# Patient Record
Sex: MALE | Race: WHITE | NOT HISPANIC OR LATINO | Employment: OTHER | URBAN - METROPOLITAN AREA
[De-identification: names, ages, dates, MRNs, and addresses within clinical notes are randomized per-mention and may not be internally consistent; named-entity substitution may affect disease eponyms.]

---

## 2022-04-28 ENCOUNTER — OFFICE VISIT (OUTPATIENT)
Dept: UROLOGY | Facility: CLINIC | Age: 77
End: 2022-04-28
Payer: MEDICARE

## 2022-04-28 VITALS
HEART RATE: 66 BPM | SYSTOLIC BLOOD PRESSURE: 100 MMHG | DIASTOLIC BLOOD PRESSURE: 58 MMHG | BODY MASS INDEX: 29.92 KG/M2 | WEIGHT: 202 LBS | HEIGHT: 69 IN

## 2022-04-28 DIAGNOSIS — N40.0 BENIGN PROSTATIC HYPERPLASIA, UNSPECIFIED WHETHER LOWER URINARY TRACT SYMPTOMS PRESENT: Primary | ICD-10-CM

## 2022-04-28 LAB — POC RESIDUAL URINE VOLUME: 0 ML (ref 0–100)

## 2022-04-28 PROCEDURE — 51798 POCT BLADDER SCAN: ICD-10-PCS | Mod: S$GLB,,, | Performed by: NURSE PRACTITIONER

## 2022-04-28 PROCEDURE — 1160F PR REVIEW ALL MEDS BY PRESCRIBER/CLIN PHARMACIST DOCUMENTED: ICD-10-PCS | Mod: CPTII,S$GLB,, | Performed by: NURSE PRACTITIONER

## 2022-04-28 PROCEDURE — 3078F DIAST BP <80 MM HG: CPT | Mod: CPTII,S$GLB,, | Performed by: NURSE PRACTITIONER

## 2022-04-28 PROCEDURE — 1159F PR MEDICATION LIST DOCUMENTED IN MEDICAL RECORD: ICD-10-PCS | Mod: CPTII,S$GLB,, | Performed by: NURSE PRACTITIONER

## 2022-04-28 PROCEDURE — 99204 OFFICE O/P NEW MOD 45 MIN: CPT | Mod: S$GLB,,, | Performed by: NURSE PRACTITIONER

## 2022-04-28 PROCEDURE — 1159F MED LIST DOCD IN RCRD: CPT | Mod: CPTII,S$GLB,, | Performed by: NURSE PRACTITIONER

## 2022-04-28 PROCEDURE — 3074F PR MOST RECENT SYSTOLIC BLOOD PRESSURE < 130 MM HG: ICD-10-PCS | Mod: CPTII,S$GLB,, | Performed by: NURSE PRACTITIONER

## 2022-04-28 PROCEDURE — 3074F SYST BP LT 130 MM HG: CPT | Mod: CPTII,S$GLB,, | Performed by: NURSE PRACTITIONER

## 2022-04-28 PROCEDURE — 1160F RVW MEDS BY RX/DR IN RCRD: CPT | Mod: CPTII,S$GLB,, | Performed by: NURSE PRACTITIONER

## 2022-04-28 PROCEDURE — 99204 PR OFFICE/OUTPT VISIT, NEW, LEVL IV, 45-59 MIN: ICD-10-PCS | Mod: S$GLB,,, | Performed by: NURSE PRACTITIONER

## 2022-04-28 PROCEDURE — 3078F PR MOST RECENT DIASTOLIC BLOOD PRESSURE < 80 MM HG: ICD-10-PCS | Mod: CPTII,S$GLB,, | Performed by: NURSE PRACTITIONER

## 2022-04-28 PROCEDURE — 51798 US URINE CAPACITY MEASURE: CPT | Mod: S$GLB,,, | Performed by: NURSE PRACTITIONER

## 2022-04-28 RX ORDER — PNV NO.95/FERROUS FUM/FOLIC AC 28MG-0.8MG
1000 TABLET ORAL DAILY
COMMUNITY

## 2022-04-28 RX ORDER — MEMANTINE HYDROCHLORIDE 10 MG/1
TABLET ORAL
COMMUNITY
Start: 2022-02-07

## 2022-04-28 RX ORDER — VITAMIN B COMPLEX
1 CAPSULE ORAL DAILY
COMMUNITY

## 2022-04-28 RX ORDER — ROSUVASTATIN CALCIUM 10 MG/1
TABLET, COATED ORAL
COMMUNITY
Start: 2022-02-07

## 2022-04-28 RX ORDER — DONEPEZIL HYDROCHLORIDE 10 MG/1
TABLET, FILM COATED ORAL
COMMUNITY
Start: 2022-02-07

## 2022-04-28 RX ORDER — FINASTERIDE 5 MG/1
5 TABLET, FILM COATED ORAL DAILY
COMMUNITY

## 2022-04-28 RX ORDER — CYANOCOBALAMIN (VITAMIN B-12) 500 MCG
TABLET ORAL
COMMUNITY

## 2022-04-28 RX ORDER — PRAMIPEXOLE DIHYDROCHLORIDE 0.5 MG/1
TABLET ORAL
COMMUNITY
Start: 2022-02-07

## 2022-04-28 RX ORDER — CHOLECALCIFEROL (VITAMIN D3) 25 MCG
1000 TABLET ORAL DAILY
COMMUNITY

## 2022-04-28 RX ORDER — NAPROXEN SODIUM 220 MG/1
81 TABLET, FILM COATED ORAL DAILY
COMMUNITY

## 2022-04-28 RX ORDER — TAMSULOSIN HYDROCHLORIDE 0.4 MG/1
CAPSULE ORAL
COMMUNITY
Start: 2022-02-07

## 2022-04-28 RX ORDER — DOXYCYCLINE 100 MG/1
CAPSULE ORAL
COMMUNITY
Start: 2022-03-22

## 2022-04-28 RX ORDER — TRAZODONE HYDROCHLORIDE 150 MG/1
TABLET ORAL
COMMUNITY
Start: 2022-02-07

## 2022-04-28 NOTE — PROGRESS NOTES
Subjective:       Patient ID: Jonathan Machado Sr is a 76 y.o. male.    Chief Complaint: Other (Nocturia & incont-mostly at night)      HPI: 76-year-old male new to the service seen today for symptoms of urinary urgency frequency incontinence.  Ongoing for greater than 1 year.  Symptoms seem to be worse at night.  He wakens wet knee a.m..  He does use trazodone for sleep.  He is on finasteride 5 mg in the a.m. and Flomax 0.8 mg in the p.m. with an evening meal.  No voiding complaints other than the incontinence.  Denies dysuria gross hematuria flank pain.  [facility] 5, 2022. Renal function within normal reference range.  No other urologic concerns.  P.m. fluid intake involves Tea with his evening meal in water before bedtime         Past Medical History:   Past Medical History:   Diagnosis Date    BPH (benign prostatic hyperplasia)     [place] (hard of hearing)     Hypercholesterolemia     Insomnia     RLS (restless legs syndrome)     Skin cancer     Unspecified dementia without behavioral disturbance        Past Surgical Historical:   Past Surgical History:   Procedure Laterality Date    APPENDECTOMY      CARDIAC SURGERY      SHOULDER SURGERY Bilateral     SKIN CANCER EXCISION      several times/places    TONSILLECTOMY      VASECTOMY      WISDOM TOOTH EXTRACTION          Medications:   Medication List with Changes/Refills   Current Medications    ASCORBIC ACID, VITAMIN C, (VITAMIN C) 100 MG TABLET    Take 100 mg by mouth once daily.    ASPIRIN 81 MG CHEW    Take 81 mg by mouth once daily.    B COMPLEX VITAMINS CAPSULE    Take 1 capsule by mouth once daily.    DONEPEZIL (ARICEPT) 10 MG TABLET        DOXYCYCLINE (MONODOX) 100 MG CAPSULE    TAKE 1 CAPSULE BY MOUTH TWICE DAILY (AVOID DAIRY, MAGNESIUM, ZINC, IRON, CALCIUM SUPPLEMENTS OR MULTIVITAMINS WITHIN 2 HOURS OF THIS MEDICATION)    FINASTERIDE (PROSCAR) 5 MG TABLET    Take 5 mg by mouth once daily.    MELATONIN 1 MG TAB     Take by mouth.    MEMANTINE (NAMENDA) 10 MG TAB        PRAMIPEXOLE (MIRAPEX) 0.5 MG TABLET        ROSUVASTATIN (CRESTOR) 10 MG TABLET        TAMSULOSIN (FLOMAX) 0.4 MG CAP        TRAZODONE (DESYREL) 150 MG TABLET        VITAMIN D (VITAMIN D3) 1000 UNITS TAB    Take 1,000 Units by mouth once daily.    VITAMIN E 1000 UNIT CAPSULE    Take 1,000 Units by mouth once daily.        Past Social History:   Social History     Socioeconomic History    Marital status:    Tobacco Use    Smoking status: Former Smoker    Smokeless tobacco: Never Used    Tobacco comment: stopped over 50 yrs ago       Allergies:   Review of patient's allergies indicates:   Allergen Reactions    Adhesive     Latex, natural rubber         Family History:   Family History   Problem Relation Age of Onset    Cancer Father     Alcohol abuse Brother         Review of Systems:  Review of Systems   Constitutional: Negative for activity change and appetite change.   HENT: Negative for congestion and dental problem.    Eyes: Negative for visual disturbance.   Respiratory: Negative for chest tightness and shortness of breath.    Cardiovascular: Negative for chest pain.   Gastrointestinal: Negative for abdominal distention and abdominal pain.   Genitourinary: Positive for enuresis, frequency and urgency. Negative for decreased urine volume, difficulty urinating, dysuria, flank pain, genital sores, hematuria, penile discharge, penile pain, penile swelling, scrotal swelling and testicular pain.   Musculoskeletal: Negative for back pain and neck pain.   Skin: Negative for color change.   Neurological: Negative for dizziness.   Hematological: Negative for adenopathy.   Psychiatric/Behavioral: Negative for agitation, behavioral problems and confusion.       Physical Exam:  Physical Exam  Constitutional:       Appearance: He is well-developed.   HENT:      Head: Normocephalic.   Eyes:      General: No scleral icterus.  Pulmonary:      Effort: Pulmonary  effort is normal.      Breath sounds: Normal breath sounds.   Abdominal:      General: There is no distension.      Palpations: Abdomen is soft.      Tenderness: There is no abdominal tenderness.      Hernia: No hernia is present. There is no hernia in the right inguinal area or left inguinal area.   Genitourinary:     Penis: Normal.       Testes: Normal. Cremasteric reflex is present.   Musculoskeletal:      Cervical back: Normal range of motion.   Skin:     General: Skin is warm and dry.   Neurological:      Mental Status: He is alert and oriented to person, place, and time.         Assessment/Plan:     overactive bladder--PVR 0 mL.  Urinalysis-- negative   trial Myrbetriq 50 mg 1 daily 1 month sample given re-evaluate 1 month   Problem List Items Addressed This Visit    None     Visit Diagnoses     Benign prostatic hyperplasia, unspecified whether lower urinary tract symptoms present    -  Primary    Relevant Orders    POCT Bladder Scan (Completed)    POCT Urinalysis (w/Micro Option)

## 2022-07-27 ENCOUNTER — OFFICE VISIT (OUTPATIENT)
Dept: UROLOGY | Facility: CLINIC | Age: 77
End: 2022-07-27
Payer: OTHER GOVERNMENT

## 2022-07-27 VITALS
WEIGHT: 200 LBS | HEART RATE: 48 BPM | RESPIRATION RATE: 18 BRPM | HEIGHT: 69 IN | BODY MASS INDEX: 29.62 KG/M2 | TEMPERATURE: 98 F | SYSTOLIC BLOOD PRESSURE: 153 MMHG | DIASTOLIC BLOOD PRESSURE: 68 MMHG

## 2022-07-27 DIAGNOSIS — N39.41 URGE URINARY INCONTINENCE: ICD-10-CM

## 2022-07-27 DIAGNOSIS — R39.15 URINARY URGENCY: Primary | ICD-10-CM

## 2022-07-27 PROCEDURE — 99214 PR OFFICE/OUTPT VISIT, EST, LEVL IV, 30-39 MIN: ICD-10-PCS | Mod: S$GLB,,, | Performed by: UROLOGY

## 2022-07-27 PROCEDURE — 99214 OFFICE O/P EST MOD 30 MIN: CPT | Mod: S$GLB,,, | Performed by: UROLOGY

## 2022-07-27 NOTE — PROGRESS NOTES
Subjective:       Patient ID: Jonathan Machado Sr is a 77 y.o. male.    Chief Complaint: Urinary Incontinence (Burning w/ urination. Bed wetting. )      HPI:  77-year-old male with a long history of urinary frequency and urgency as a kid he had the have special permission to be able to urinate whenever he needed over the past year or so he has developed worsening urinary urgency and enuresis particularly at night does not generally have much trouble while awake    Past Medical History:   Past Medical History:   Diagnosis Date    BPH (benign prostatic hyperplasia)     [place] (hard of hearing)     Hypercholesterolemia     Insomnia     RLS (restless legs syndrome)     Skin cancer     Unspecified dementia without behavioral disturbance        Past Surgical Historical:   Past Surgical History:   Procedure Laterality Date    APPENDECTOMY      CARDIAC SURGERY      SHOULDER SURGERY Bilateral     SKIN CANCER EXCISION      several times/places    TONSILLECTOMY      VASECTOMY      WISDOM TOOTH EXTRACTION          Medications:   Medication List with Changes/Refills   Current Medications    ASCORBIC ACID, VITAMIN C, (VITAMIN C) 100 MG TABLET    Take 100 mg by mouth once daily.    ASPIRIN 81 MG CHEW    Take 81 mg by mouth once daily.    B COMPLEX VITAMINS CAPSULE    Take 1 capsule by mouth once daily.    DONEPEZIL (ARICEPT) 10 MG TABLET        DOXYCYCLINE (MONODOX) 100 MG CAPSULE    TAKE 1 CAPSULE BY MOUTH TWICE DAILY (AVOID DAIRY, MAGNESIUM, ZINC, IRON, CALCIUM SUPPLEMENTS OR MULTIVITAMINS WITHIN 2 HOURS OF THIS MEDICATION)    FINASTERIDE (PROSCAR) 5 MG TABLET    Take 5 mg by mouth once daily.    MELATONIN (MELATIN)    Take by mouth.    MEMANTINE (NAMENDA) 10 MG TAB        PRAMIPEXOLE (MIRAPEX) 0.5 MG TABLET        ROSUVASTATIN (CRESTOR) 10 MG TABLET        TAMSULOSIN (FLOMAX) 0.4 MG CAP        TRAZODONE (DESYREL) 150 MG TABLET        VITAMIN D (VITAMIN D3) 1000 UNITS TAB    Take 1,000 Units by mouth once daily.     VITAMIN E 1000 UNIT CAPSULE    Take 1,000 Units by mouth once daily.        Past Social History:   Social History     Socioeconomic History    Marital status:    Tobacco Use    Smoking status: Former Smoker    Smokeless tobacco: Never Used    Tobacco comment: stopped over 50 yrs ago       Allergies:   Review of patient's allergies indicates:   Allergen Reactions    Adhesive     Latex, natural rubber         Family History:   Family History   Problem Relation Age of Onset    Cancer Father     Alcohol abuse Brother         Review of Systems:  Review of Systems   Constitutional: Negative for activity change and appetite change.   HENT: Negative for congestion and dental problem.    Eyes: Negative for visual disturbance.   Respiratory: Negative for chest tightness and shortness of breath.    Cardiovascular: Negative for chest pain.   Gastrointestinal: Negative for abdominal distention and abdominal pain.   Genitourinary: Negative for decreased urine volume, difficulty urinating, dysuria, enuresis, flank pain, frequency, genital sores, hematuria, penile discharge, penile pain, penile swelling, scrotal swelling, testicular pain and urgency.   Musculoskeletal: Negative for back pain and neck pain.   Skin: Negative for color change.   Neurological: Negative for dizziness.   Hematological: Negative for adenopathy.   Psychiatric/Behavioral: Negative for agitation, behavioral problems and confusion.       Physical Exam:  Physical Exam  Constitutional:       General: He is not in acute distress.     Appearance: He is well-developed.   HENT:      Head: Normocephalic and atraumatic.      Nose: Nose normal.   Eyes:      General: No scleral icterus.     Conjunctiva/sclera: Conjunctivae normal.      Pupils: Pupils are equal, round, and reactive to light.   Neck:      Thyroid: No thyromegaly.      Trachea: No tracheal deviation.   Cardiovascular:      Rate and Rhythm: Normal rate and regular rhythm.      Heart sounds:  Normal heart sounds.   Pulmonary:      Effort: Pulmonary effort is normal. No respiratory distress.      Breath sounds: Normal breath sounds. No wheezing or rales.   Abdominal:      General: Bowel sounds are normal. There is no distension.      Palpations: Abdomen is soft.      Tenderness: There is no abdominal tenderness. There is no guarding or rebound.   Genitourinary:     Penis: Normal. No tenderness.       Prostate: Normal.   Musculoskeletal:         General: No deformity. Normal range of motion.      Cervical back: Neck supple.   Lymphadenopathy:      Cervical: No cervical adenopathy.   Skin:     General: Skin is warm and dry.      Findings: No erythema or rash.   Neurological:      Mental Status: He is alert and oriented to person, place, and time.      Cranial Nerves: No cranial nerve deficit.   Psychiatric:         Behavior: Behavior normal.         Assessment/Plan:       Problem List Items Addressed This Visit    None     Visit Diagnoses     Urinary urgency    -  Primary    Urge urinary incontinence                 77-year-old male with what appears to be overactive bladder:  Due to the unusual presentation we will proceed with cystoscopy next available date and schedule him for urodynamics

## 2022-08-22 ENCOUNTER — TELEPHONE (OUTPATIENT)
Dept: UROLOGY | Facility: CLINIC | Age: 77
End: 2022-08-22
Payer: OTHER GOVERNMENT

## 2022-08-23 ENCOUNTER — PROCEDURE VISIT (OUTPATIENT)
Dept: UROLOGY | Facility: CLINIC | Age: 77
End: 2022-08-23
Payer: OTHER GOVERNMENT

## 2022-08-23 VITALS — HEIGHT: 60 IN | WEIGHT: 202 LBS | BODY MASS INDEX: 39.66 KG/M2

## 2022-08-23 DIAGNOSIS — N39.41 URGE URINARY INCONTINENCE: ICD-10-CM

## 2022-08-23 DIAGNOSIS — R39.15 URINARY URGENCY: ICD-10-CM

## 2022-08-23 PROCEDURE — 52000 CYSTOSCOPY: ICD-10-PCS | Mod: S$GLB,,, | Performed by: UROLOGY

## 2022-08-23 PROCEDURE — 52000 CYSTOURETHROSCOPY: CPT | Mod: S$GLB,,, | Performed by: UROLOGY

## 2022-08-23 RX ORDER — OXYBUTYNIN CHLORIDE 10 MG/1
10 TABLET, EXTENDED RELEASE ORAL DAILY
Qty: 30 TABLET | Refills: 11 | Status: SHIPPED | OUTPATIENT
Start: 2022-08-23 | End: 2023-08-23

## 2022-08-23 RX ORDER — OXYBUTYNIN CHLORIDE 10 MG/1
10 TABLET, EXTENDED RELEASE ORAL DAILY
Qty: 30 TABLET | Refills: 11 | Status: SHIPPED | OUTPATIENT
Start: 2022-08-23 | End: 2022-08-23 | Stop reason: CLARIF

## 2022-08-23 NOTE — PROCEDURES
Cystoscopy    Date/Time: 8/23/2022 3:00 PM  Performed by: Hiram Brandt MD  Authorized by: Hiram Brandt MD     Consent Done?:  Yes (Written)  Timeout: prior to procedure the correct patient, procedure, and site was verified    Prep: patient was prepped and draped in usual sterile fashion    Anesthesia:  Intraurethral instillation  Indications: hematuria    Position:  Supine  Anesthesia:  Intraurethral instillation  Patient sedated?: No    Preparation: Patient was prepped and draped in usual sterile fashion    Scope type:  Flexible cystoscope  External exam normal: Yes    Urethra normal: Yes    Prostate normal: Yes    Comments:      Patient was brought to the procedure room placed on the table padded prepped and draped in usual sterile fashion in supine position. The cystoscope was inserted into the urethra and advanced the urethra was normal prostate showed expected enlargement for patient's age, the bladder is entered and inspected is found to be free of tumor stone or foreign body.  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

## 2022-08-23 NOTE — PATIENT INSTRUCTIONS
Patient Education       Cystoscopy Discharge Instructions   About this topic   Your kidneys make urine. It is stored in your bladder. The urethra is a tube at the bottom of the bladder. Urine flows out of this tube. Sometimes, there is a blockage and urine is not able to leave the body.  A cystoscopy is a procedure that lets the doctor see the inside of your bladder and urethra. The doctor does it to:  Look for stones or tumors blocking the bladder and urethra  Look for changes or injury inside the bladder  Take a tissue sample from the inside of your bladder  Look for reasons for blood in the urine, pain with urination, or why you are passing urine often  Look for prostate problems     What care is needed at home?   Ask your doctor what you need to do when you go home. Make sure you ask questions if you do not understand what the doctor says. This way you will know what you need to do.  Take a warm bath or use a warm wet washcloth over the opening to the urethra. This will help to ease any pain. Do this as needed.  Drink 6 to 8 glasses of water a day and 3 to 4 glasses in the first few hours after the procedure to flush out your bladder and reduce irritation.  You may see some blood in your urine for a few days. This is normal.  Empty your bladder as soon as you feel the need to. Don't delay going to the bathroom. It stretches and weakens the bladder.  What follow-up care is needed?   Your doctor may ask you to make visits to the office to check on your progress. Be sure to keep these visits.  If you had a biopsy, talk with your doctor about the results.  What drugs may be needed?   The doctor may order drugs to:  Help with pain  Fight an infection  Help with bladder spasms  Will physical activity be limited?   Talk to your doctor about when you may go back to your normal activities like work, driving, or sex.  What problems could happen?   Bleeding  Infection  Injury to the bladder and urethra  Discomfort in the  urethra area  Burning sensation for a short time  Upset stomach  When do I need to call the doctor?   Signs of infection. These include a fever of 100.4°F (38°C) or higher, chills, pain with passing urine.  Pain that does not go away even with drugs or that lasts longer than 2 days  Too much blood in your urine  Passing large dime-sized clots  Cloudy urine  Little or no urine or not able to pass urine  Abdominal pain and nausea  Teach Back: Helping You Understand   The Teach Back Method helps you understand the information we are giving you. After you talk with the staff, tell them in your own words what you learned. This helps to make sure the staff has described each thing clearly. It also helps to explain things that may have been confusing. Before going home, make sure you can do these:  I can tell you about my procedure.  I can tell you what may help ease my pain.  I can tell you what I will do if I have a fever, chills, or am not able to pass urine.  Where can I learn more?   [employer]  https://www.cancer.org/treatment/understanding-your-diagnosis/tests/endoscopy/cystoscopy.html   [employer]  https://www.cancerresearchuk.org/about-cancer/bladder-cancer/getting-diagnosed/tests-diagnose/cystoscopy   [facility] Choices  http://www.nhs.uk/conditions/Cystoscopy/Pages/Introduction.aspx   Last Reviewed Date   2021-04-22  Consumer Information Use and Disclaimer   This information is not specific medical advice and does not replace information you receive from your health care provider. This is only a brief summary of general information. It does NOT include all information about conditions, illnesses, injuries, tests, procedures, treatments, therapies, discharge instructions or life-style choices that may apply to you. You must talk with your health care provider for complete information about your health and treatment options. This information should not be used to decide whether or not to accept your  health care providers advice, instructions or recommendations. Only your health care provider has the knowledge and training to provide advice that is right for you.  Copyright   Copyright © 2021 [employer] and its affiliates and/or licensors. All rights reserved.

## 2022-09-21 ENCOUNTER — PROCEDURE VISIT (OUTPATIENT)
Dept: UROLOGY | Facility: CLINIC | Age: 77
End: 2022-09-21
Payer: OTHER GOVERNMENT

## 2022-09-21 DIAGNOSIS — R39.15 URINARY URGENCY: ICD-10-CM

## 2022-09-21 DIAGNOSIS — N39.41 URGE URINARY INCONTINENCE: ICD-10-CM

## 2022-09-30 ENCOUNTER — TELEPHONE (OUTPATIENT)
Dept: UROLOGY | Facility: CLINIC | Age: 77
End: 2022-09-30
Payer: OTHER GOVERNMENT

## 2022-09-30 NOTE — TELEPHONE ENCOUNTER
Recent office notes faxed to [employer] at number provided. [phone]    ----- Message from Gayle Yeung sent at 9/30/2022  1:27 PM CDT -----  Contact: Tanna panchal/kim panchal/kim agudelo calling for pt notes for 7/27/2022 and she can be reached at [phone] and fax is   [phone].    Thanks,

## 2022-10-20 ENCOUNTER — TELEPHONE (OUTPATIENT)
Dept: UROLOGY | Facility: CLINIC | Age: 77
End: 2022-10-20
Payer: OTHER GOVERNMENT

## 2022-10-20 NOTE — TELEPHONE ENCOUNTER
Contacted, spoke with spouse. Advised of results. Dr. Brandt suggest that pt continue rx as (Finasteride, Flomax, Oxybutynin) prescribed. Pt has not been taking the Finasteride. Advised that pt should start back doing so, due to the enlarged prostate per EHR. Spouse verbalized understanding, advised to contact if need be. BJP    ----- Message from Hafsa Handy MA sent at 10/19/2022  2:28 PM CDT -----  Contact: pt  Need to have Dr. Brandt review uds and call pt with resutls.    ----- Message -----  From: Armin Molina  Sent: 10/19/2022   1:24 PM CDT  To: [facility] Staff    .Type:  Patient Returning Call    Who Called:mrs rachel   Who Left Message for Patient:  Does the patient know what this is regarding?:procedure & rx  Would the patient rather a call back or a response via [facility]?   Best Call Back Number:.[phone]    Additional Information:

## 2022-11-22 NOTE — PROCEDURES
Procedures  Complex urodynamics with uroflowmetry     Maximum flow rate 17 seconds   Flow rate:  14 seconds   Voided volume:  95 cc   Pattern: Continuous  Mean flow 7 mL/second   Total voiding volume time 16 seconds   Postvoid residual 50 mL    Abdominal leak point pressure measures of urethral resistance   Detrusor overactivity incontinence at 139 mL with detrusor pressure of 67 cm of water maximum urethral closure pressure        During the filling phase the EMG activity was appropriate as was during the emptying phase    Urodynamic diagnosis urge urinary incontinence and urinary urgency with incomplete bladder emptying and urinary retention    Patient would benefit from anticholinergics or beta 2 agonists  We can discuss potentially Botox if these fail